# Patient Record
Sex: MALE | Race: WHITE | NOT HISPANIC OR LATINO | Employment: FULL TIME | URBAN - METROPOLITAN AREA
[De-identification: names, ages, dates, MRNs, and addresses within clinical notes are randomized per-mention and may not be internally consistent; named-entity substitution may affect disease eponyms.]

---

## 2024-05-06 ENCOUNTER — OFFICE VISIT (OUTPATIENT)
Dept: ORTHOPEDIC SURGERY | Facility: CLINIC | Age: 34
End: 2024-05-06
Payer: COMMERCIAL

## 2024-05-06 VITALS — HEIGHT: 71 IN | BODY MASS INDEX: 44.1 KG/M2 | WEIGHT: 315 LBS

## 2024-05-06 DIAGNOSIS — M77.8 LEFT WRIST TENDINITIS: Primary | ICD-10-CM

## 2024-05-06 PROCEDURE — 99213 OFFICE O/P EST LOW 20 MIN: CPT | Performed by: FAMILY MEDICINE

## 2024-05-06 PROCEDURE — 1036F TOBACCO NON-USER: CPT | Performed by: FAMILY MEDICINE

## 2024-05-06 NOTE — PROGRESS NOTES
History of Present Illness   Chief Complaint   Patient presents with    Right Wrist - Pain     Nki  Pain in wrist, travels to forearm  x 1 1/2 weeks         The patient is 33 y.o. male  here with a complaint of pain in his left wrist and forearm.  Atraumatic onset of symptoms over the past 10 days or so.  He denies any injury or known inciting incident.  Patient says that he was doing some yard work prior to his symptom onset but does not recall any specific thing that he may have done.  He denies any swelling, no redness or warmth.  He denies extremity significant wrist problems in the past.  He has been wearing a brace that he got over-the-counter for the past few days, does feel better in the brace but wanted to make sure he was not doing any harm by wearing the brace.  He is not taking medications regularly for pain at this time.  He denies any range of motion deficits at the wrist.  He says that he will occasionally get some tingling into his hand.    Past Medical History:   Diagnosis Date    Other specified health status     No pertinent past medical history    Personal history of malignant neoplasm, unspecified     History of malignant neoplasm       Medication Documentation Review Audit    **Prior to Admission medications have not yet been reviewed**         Allergies   Allergen Reactions    Latex, Natural Rubber Anaphylaxis, Hives and Swelling    Codeine Hives and Unknown       Social History     Socioeconomic History    Marital status: Single     Spouse name: Not on file    Number of children: Not on file    Years of education: Not on file    Highest education level: Not on file   Occupational History    Not on file   Tobacco Use    Smoking status: Never    Smokeless tobacco: Never   Substance and Sexual Activity    Alcohol use: Never    Drug use: Never    Sexual activity: Not on file   Other Topics Concern    Not on file   Social History Narrative    Not on file     Social Determinants of Health      Financial Resource Strain: Not on file   Food Insecurity: Not on file   Transportation Needs: Not on file   Physical Activity: Not on file   Stress: Not on file   Social Connections: Not on file   Intimate Partner Violence: Not on file   Housing Stability: Not on file       No past surgical history on file.       Review of Systems   GENERAL: Negative  GI: Negative  MUSCULOSKELETAL: See HPI  SKIN: Negative  NEURO:  Negative     Physical Exam:    General/Constitutional: well appearing, no distress, appears stated age  HEENT: sclera clear  Respiratory: non labored breathing  Vascular: No edema, swelling or tenderness, except as noted in detailed exam.  Integumentary: No impressive skin lesions present, except as noted in detailed exam.  Neurological:  Alert and oriented   Psychological:  Normal mood and affect.  Musculoskeletal: Normal, except as noted in detailed exam and in HPI    Left wrist/forearm: Normal appearance, there is no swelling, there is no redness or warmth.  He has some very minimal tenderness palpation over the dorsum of the wrist near the distal radius and extensor compartment.  No other areas of tenderness to palpation at the wrist.  He has full range of motion at the wrist in all directions without exacerbation of pain.  There is no pain or weakness with resisted wrist flexion, extension, pronation or supination or with ulnar or radial deviation.  At the hand there is no motor or sensory deficits the median, ulnar, radial nerve distribution.  2+ radial pulse       Imaging: No imaging today      Assessment   1. Left wrist tendinitis          Atraumatic left wrist pain over dorsum of wrist into forearm suggestive of some potential extensor tendinitis    Plan: Discussed diagnosis, further workup and treatment.  No indication for any x-rays today.  He has started to see some improvement with the wrist brace immobilization, he can continue with this for comfort over the next 1 to 2 weeks, he may  benefit from some more regular NSAID use over the course of the next week or so, recommended Aleve twice a day for the next 5 to 7 days.  He will continue to modify activities.  He will follow-up if symptoms are ongoing.